# Patient Record
Sex: MALE | Race: OTHER | HISPANIC OR LATINO
[De-identification: names, ages, dates, MRNs, and addresses within clinical notes are randomized per-mention and may not be internally consistent; named-entity substitution may affect disease eponyms.]

---

## 2018-08-10 PROBLEM — Z00.00 ENCOUNTER FOR PREVENTIVE HEALTH EXAMINATION: Status: ACTIVE | Noted: 2018-08-10

## 2018-08-13 ENCOUNTER — APPOINTMENT (OUTPATIENT)
Dept: SURGERY | Facility: CLINIC | Age: 58
End: 2018-08-13
Payer: COMMERCIAL

## 2018-08-13 VITALS
SYSTOLIC BLOOD PRESSURE: 117 MMHG | HEART RATE: 56 BPM | BODY MASS INDEX: 30.12 KG/M2 | DIASTOLIC BLOOD PRESSURE: 67 MMHG | WEIGHT: 170 LBS | TEMPERATURE: 97.7 F | HEIGHT: 63 IN

## 2018-08-13 PROCEDURE — 99204 OFFICE O/P NEW MOD 45 MIN: CPT

## 2018-09-11 ENCOUNTER — TRANSCRIPTION ENCOUNTER (OUTPATIENT)
Age: 58
End: 2018-09-11

## 2018-09-11 ENCOUNTER — OUTPATIENT (OUTPATIENT)
Dept: OUTPATIENT SERVICES | Facility: HOSPITAL | Age: 58
LOS: 1 days | End: 2018-09-11
Payer: COMMERCIAL

## 2018-09-11 VITALS
HEIGHT: 62 IN | OXYGEN SATURATION: 98 % | HEART RATE: 60 BPM | TEMPERATURE: 98 F | WEIGHT: 167.99 LBS | RESPIRATION RATE: 16 BRPM | DIASTOLIC BLOOD PRESSURE: 78 MMHG | SYSTOLIC BLOOD PRESSURE: 118 MMHG

## 2018-09-11 DIAGNOSIS — D36.7 BENIGN NEOPLASM OF OTHER SPECIFIED SITES: ICD-10-CM

## 2018-09-11 DIAGNOSIS — Z86.018 PERSONAL HISTORY OF OTHER BENIGN NEOPLASM: Chronic | ICD-10-CM

## 2018-09-11 DIAGNOSIS — R22.2 LOCALIZED SWELLING, MASS AND LUMP, TRUNK: ICD-10-CM

## 2018-09-11 DIAGNOSIS — Z01.818 ENCOUNTER FOR OTHER PREPROCEDURAL EXAMINATION: ICD-10-CM

## 2018-09-11 DIAGNOSIS — Z90.49 ACQUIRED ABSENCE OF OTHER SPECIFIED PARTS OF DIGESTIVE TRACT: Chronic | ICD-10-CM

## 2018-09-11 DIAGNOSIS — R22.31 LOCALIZED SWELLING, MASS AND LUMP, RIGHT UPPER LIMB: ICD-10-CM

## 2018-09-11 PROCEDURE — G0463: CPT

## 2018-09-11 NOTE — H&P PST ADULT - HISTORY OF PRESENT ILLNESS
58 years old male presented for evaluation to PST before planned surgery, pt was diagnosed with mass on back and arm and is scheduled for excision of right forearm and right lower back masses on 9/12/18.

## 2018-09-11 NOTE — H&P PST ADULT - GASTROINTESTINAL DETAILS
soft/no masses palpable/no distention/no bruit/normal/no rebound tenderness/bowel sounds normal/nontender

## 2018-09-11 NOTE — H&P PST ADULT - SKIN COMMENTS
right lower back mass, soft, slight tender to palpation, right forearm mass 1 cm, hard, mobile no pain noted

## 2018-09-11 NOTE — H&P PST ADULT - FAMILY HISTORY
Father  Still living? No  Diabetes mellitus, type 2, Age at diagnosis: Age Unknown     Mother  Still living? No  Diabetes mellitus, type 2, Age at diagnosis: Age Unknown

## 2018-09-11 NOTE — H&P PST ADULT - NEGATIVE CARDIOVASCULAR SYMPTOMS
no peripheral edema/no chest pain/no dyspnea on exertion/no palpitations/no claudication/no paroxysmal nocturnal dyspnea/no orthopnea

## 2018-09-11 NOTE — H&P PST ADULT - RS GEN PE MLT RESP DETAILS PC
no rales/breath sounds equal/no rhonchi/clear to auscultation bilaterally/no wheezes/good air movement/no chest wall tenderness/respirations non-labored/airway patent

## 2018-09-11 NOTE — H&P PST ADULT - BLOOD AVOIDANCE/RESTRICTIONS, PROFILE
Gnosticist beliefs/pt enrolled to Bloodless Program Cat 1, no blood transfusion, documents given to Alisia Hathaway , Bloodless

## 2018-09-11 NOTE — H&P PST ADULT - NEGATIVE OPHTHALMOLOGIC SYMPTOMS
no irritation L/no loss of vision R/no discharge R/no pain R/no irritation R/no loss of vision L/no pain L

## 2018-09-11 NOTE — H&P PST ADULT - NEGATIVE GASTROINTESTINAL SYMPTOMS
no constipation/no flatulence/no abdominal pain/no nausea/no vomiting/no change in bowel habits/no diarrhea

## 2018-09-11 NOTE — H&P PST ADULT - NSANTHOSAYNRD_GEN_A_CORE
No. [place] screening performed.  STOP BANG Legend: 0-2 = LOW Risk; 3-4 = INTERMEDIATE Risk; 5-8 = HIGH Risk

## 2018-09-12 ENCOUNTER — RESULT REVIEW (OUTPATIENT)
Age: 58
End: 2018-09-12

## 2018-09-12 ENCOUNTER — OUTPATIENT (OUTPATIENT)
Dept: OUTPATIENT SERVICES | Facility: HOSPITAL | Age: 58
LOS: 1 days | End: 2018-09-12
Payer: COMMERCIAL

## 2018-09-12 VITALS
DIASTOLIC BLOOD PRESSURE: 56 MMHG | TEMPERATURE: 98 F | RESPIRATION RATE: 16 BRPM | HEART RATE: 56 BPM | SYSTOLIC BLOOD PRESSURE: 111 MMHG | OXYGEN SATURATION: 100 %

## 2018-09-12 VITALS
OXYGEN SATURATION: 100 % | DIASTOLIC BLOOD PRESSURE: 62 MMHG | RESPIRATION RATE: 16 BRPM | TEMPERATURE: 98 F | SYSTOLIC BLOOD PRESSURE: 107 MMHG | HEART RATE: 60 BPM

## 2018-09-12 DIAGNOSIS — R22.2 LOCALIZED SWELLING, MASS AND LUMP, TRUNK: ICD-10-CM

## 2018-09-12 DIAGNOSIS — Z01.818 ENCOUNTER FOR OTHER PREPROCEDURAL EXAMINATION: ICD-10-CM

## 2018-09-12 DIAGNOSIS — D36.7 BENIGN NEOPLASM OF OTHER SPECIFIED SITES: ICD-10-CM

## 2018-09-12 DIAGNOSIS — Z90.49 ACQUIRED ABSENCE OF OTHER SPECIFIED PARTS OF DIGESTIVE TRACT: Chronic | ICD-10-CM

## 2018-09-12 DIAGNOSIS — Z86.018 PERSONAL HISTORY OF OTHER BENIGN NEOPLASM: Chronic | ICD-10-CM

## 2018-09-12 LAB
GLUCOSE BLDC GLUCOMTR-MCNC: 100 MG/DL — HIGH (ref 70–99)
GLUCOSE BLDC GLUCOMTR-MCNC: 106 MG/DL — HIGH (ref 70–99)

## 2018-09-12 PROCEDURE — 25075 EXC FOREARM LES SC < 3 CM: CPT | Mod: RT

## 2018-09-12 PROCEDURE — 88304 TISSUE EXAM BY PATHOLOGIST: CPT | Mod: 26

## 2018-09-12 PROCEDURE — 21933 EXC BACK TUM DEEP 5 CM/>: CPT

## 2018-09-12 PROCEDURE — 21933 EXC BACK TUM DEEP 5 CM/>: CPT | Mod: AS

## 2018-09-12 PROCEDURE — 82962 GLUCOSE BLOOD TEST: CPT

## 2018-09-12 PROCEDURE — 88304 TISSUE EXAM BY PATHOLOGIST: CPT

## 2018-09-12 RX ORDER — ACETAMINOPHEN WITH CODEINE 300MG-30MG
1 TABLET ORAL
Qty: 12 | Refills: 0
Start: 2018-09-12

## 2018-09-12 RX ORDER — HYDROMORPHONE HYDROCHLORIDE 2 MG/ML
0.5 INJECTION INTRAMUSCULAR; INTRAVENOUS; SUBCUTANEOUS
Qty: 0 | Refills: 0 | Status: DISCONTINUED | OUTPATIENT
Start: 2018-09-12 | End: 2018-09-12

## 2018-09-12 NOTE — ASU DISCHARGE PLAN (ADULT/PEDIATRIC). - MEDICATION SUMMARY - MEDICATIONS TO TAKE
I will START or STAY ON the medications listed below when I get home from the hospital:    Tylenol with Codeine #3 oral tablet  -- 1-2  tab(s) by mouth every 4-6 hours, As Needed -for severe pain MDD:6  -- Caution federal law prohibits the transfer of this drug to any person other  than the person for whom it was prescribed.  May cause drowsiness.  Alcohol may intensify this effect.  Use care when operating dangerous machinery.  This product contains acetaminophen.  Do not use  with any other product containing acetaminophen to prevent possible liver damage.  Using more of this medication than prescribed may cause serious breathing problems.    -- Indication: For postop pain    Advil 200 mg oral tablet  -- 1 tab(s) by mouth every 6 hours, As Needed  -- Indication: For pain    metFORMIN 500 mg oral tablet  -- 1 tab(s) by mouth 2 times a day  -- Indication: For diabetes

## 2018-09-12 NOTE — ASU PATIENT PROFILE, ADULT - BLOOD AVOIDANCE/RESTRICTIONS, PROFILE
Episcopal beliefs/pt enrolled to Bloodless Program Cat 1, no blood transfusion, documents given to Alisia Hathaway , Bloodless

## 2018-09-12 NOTE — ASU DISCHARGE PLAN (ADULT/PEDIATRIC). - NOTIFY
Unable to Urinate/Increased Irritability or Sluggishness/Inability to Tolerate Liquids or Foods/Excessive Diarrhea/Fever greater than 101/Swelling that continues/Pain not relieved by Medications/Bleeding that does not stop/Persistent Nausea and Vomiting/Numbness, color, or temperature change to extremity/Numbness, tingling

## 2018-09-12 NOTE — ASU DISCHARGE PLAN (ADULT/PEDIATRIC). - SPECIAL INSTRUCTIONS
Drink plenty of fluids. Rest as needed. Do not lift anything heavier than 10 pounds. You may take motrin or advil for mild pain as needed, in addition to prescribed narcotic medication. You may remove dressing and shower in 48 hours. Please keep white steri-strips in place and allow them to fall off on there own. Pat dry.

## 2018-09-12 NOTE — BRIEF OPERATIVE NOTE - POST-OP DX
Arm mass, right  09/12/2018    Active  Geovanna Wellington  Mass on back  09/12/2018    Active  Geovanna Wellington

## 2018-09-12 NOTE — BRIEF OPERATIVE NOTE - PROCEDURE
<<-----Click on this checkbox to enter Procedure Excision of mass of back  09/12/2018    Active  KKOTAK  Excision of mass of arm  09/12/2018  right  Active  KKOTAK

## 2018-09-17 LAB — SURGICAL PATHOLOGY STUDY: SIGNIFICANT CHANGE UP

## 2018-09-20 ENCOUNTER — APPOINTMENT (OUTPATIENT)
Dept: SURGERY | Facility: CLINIC | Age: 58
End: 2018-09-20
Payer: COMMERCIAL

## 2018-09-20 DIAGNOSIS — Z78.9 OTHER SPECIFIED HEALTH STATUS: ICD-10-CM

## 2018-09-20 DIAGNOSIS — L91.0 HYPERTROPHIC SCAR: ICD-10-CM

## 2018-09-20 DIAGNOSIS — Z80.9 FAMILY HISTORY OF MALIGNANT NEOPLASM, UNSPECIFIED: ICD-10-CM

## 2018-09-20 DIAGNOSIS — Z86.39 PERSONAL HISTORY OF OTHER ENDOCRINE, NUTRITIONAL AND METABOLIC DISEASE: ICD-10-CM

## 2018-09-20 DIAGNOSIS — R22.2 LOCALIZED SWELLING, MASS AND LUMP, TRUNK: ICD-10-CM

## 2018-09-20 PROCEDURE — 99024 POSTOP FOLLOW-UP VISIT: CPT

## 2018-09-20 RX ORDER — GEL-MATRIX PAD DRESS, SILICONE 2"X5.5"
PAD TOPICAL
Qty: 4 | Refills: 0 | Status: ACTIVE | COMMUNITY
Start: 2018-09-20 | End: 1900-01-01

## 2018-09-21 PROBLEM — R22.31 LOCALIZED SWELLING, MASS AND LUMP, RIGHT UPPER LIMB: Chronic | Status: ACTIVE | Noted: 2018-09-11

## 2018-09-21 PROBLEM — R22.2 LOCALIZED SWELLING, MASS AND LUMP, TRUNK: Chronic | Status: ACTIVE | Noted: 2018-09-11

## 2018-09-21 PROBLEM — E11.9 TYPE 2 DIABETES MELLITUS WITHOUT COMPLICATIONS: Chronic | Status: ACTIVE | Noted: 2018-09-11

## 2018-09-21 PROBLEM — K37 UNSPECIFIED APPENDICITIS: Chronic | Status: ACTIVE | Noted: 2018-09-11

## 2021-03-12 ENCOUNTER — OUTPATIENT (OUTPATIENT)
Dept: OUTPATIENT SERVICES | Facility: HOSPITAL | Age: 61
LOS: 1 days | End: 2021-03-12
Payer: MEDICAID

## 2021-03-12 VITALS
HEART RATE: 60 BPM | DIASTOLIC BLOOD PRESSURE: 76 MMHG | HEIGHT: 62 IN | OXYGEN SATURATION: 100 % | RESPIRATION RATE: 18 BRPM | TEMPERATURE: 98 F | SYSTOLIC BLOOD PRESSURE: 144 MMHG | WEIGHT: 160.06 LBS

## 2021-03-12 DIAGNOSIS — Z98.890 OTHER SPECIFIED POSTPROCEDURAL STATES: Chronic | ICD-10-CM

## 2021-03-12 DIAGNOSIS — Z86.018 PERSONAL HISTORY OF OTHER BENIGN NEOPLASM: Chronic | ICD-10-CM

## 2021-03-12 DIAGNOSIS — Z01.818 ENCOUNTER FOR OTHER PREPROCEDURAL EXAMINATION: ICD-10-CM

## 2021-03-12 DIAGNOSIS — R97.20 ELEVATED PROSTATE SPECIFIC ANTIGEN [PSA]: ICD-10-CM

## 2021-03-12 DIAGNOSIS — Z90.49 ACQUIRED ABSENCE OF OTHER SPECIFIED PARTS OF DIGESTIVE TRACT: Chronic | ICD-10-CM

## 2021-03-12 DIAGNOSIS — R73.03 PREDIABETES: ICD-10-CM

## 2021-03-12 DIAGNOSIS — J30.2 OTHER SEASONAL ALLERGIC RHINITIS: ICD-10-CM

## 2021-03-12 DIAGNOSIS — S83.249A OTHER TEAR OF MEDIAL MENISCUS, CURRENT INJURY, UNSPECIFIED KNEE, INITIAL ENCOUNTER: ICD-10-CM

## 2021-03-12 DIAGNOSIS — M23.331 OTHER MENISCUS DERANGEMENTS, OTHER MEDIAL MENISCUS, RIGHT KNEE: ICD-10-CM

## 2021-03-12 PROCEDURE — G0463: CPT

## 2021-03-12 NOTE — H&P PST ADULT - NSICDXPASTSURGICALHX_GEN_ALL_CORE_FT
PAST SURGICAL HISTORY:  H/O lipoma right lower back, right upper back, left thigh excision - 2003    S/P appendectomy 1998     PAST SURGICAL HISTORY:  H/O excision of mass right forearm and right back masses on 09/12/2018    H/O lipoma right lower back, right upper back, left thigh excision - 2003    S/P appendectomy 1998

## 2021-03-12 NOTE — H&P PST ADULT - NSICDXPROBLEM_GEN_ALL_CORE_FT
The patient has been examined and the H&P has been reviewed:    I concur with the findings and no changes have occurred since H&P was written.    Anesthesia/Surgery risks, benefits and alternative options discussed and understood by patient/family.          There are no hospital problems to display for this patient.     PROBLEM DIAGNOSES  Problem: Tear of medial meniscus of knee  Assessment and Plan: Right knee arthroscopy on 03/23/2021. Preoperative instructions discussed with pt and given to pt. Instructed pt that no one will be allowed to come to hospital with him, to notify security when he arrives in the lobby of the hospital that he is here for surgery, that he will need someone to come to the hospital to pick him up after surgery,not to eat or drink anything after midnight the night before the surgery, to avoid NSAIDs such as Ibuprofen, Motrin, Aleve, Advil, naproxen before surgery, to take Tylenol if needed for pain, to report if he has been exposed to anyone with any contagious diseases including Covid-19 or if he is exhibiting any symptoms of COVID-19,  to keep appointment for COVID-19  test 3 days before surgery. Instructed about use of Chlorhexidine 4% soap before surgery. Verbalized understanding of instructions given.     Problem: Prediabetes  Assessment and Plan: Perioperative glucose monitoring and coverage as needed. Follow-up with PCP for diabetic management.        PROBLEM DIAGNOSES  Problem: Elevated PSA, less than 10 ng/ml  Assessment and Plan: Follow-up with PCP/urology for management.     Problem: Tear of medial meniscus of knee  Assessment and Plan: Right knee arthroscopy on 03/23/2021. Preoperative instructions discussed with pt and given to pt. Instructed pt that no one will be allowed to come to hospital with him, to notify security when he arrives in the lobby of the hospital that he is here for surgery, that he will need someone to come to the hospital to pick him up after surgery,not to eat or drink anything after midnight the night before the surgery, to avoid NSAIDs such as Ibuprofen, Motrin, Aleve, Advil, naproxen before surgery, to take Tylenol if needed for pain, to report if he has been exposed to anyone with any contagious diseases including Covid-19 or if he is exhibiting any symptoms of COVID-19,  to keep appointment for COVID-19  test 3 days before surgery. Instructed about use of Chlorhexidine 4% soap before surgery. Verbalized understanding of instructions given.     Problem: Prediabetes  Assessment and Plan: Perioperative glucose monitoring and coverage as needed. Follow-up with PCP for diabetic management.

## 2021-03-12 NOTE — H&P PST ADULT - NSICDXPASTMEDICALHX_GEN_ALL_CORE_FT
PAST MEDICAL HISTORY:  Appendicitis     Arm mass, right     Asthma last attack in 2001    Diabetes mellitus, type 2     Mass on back     Tear of medial meniscus of right knee      PAST MEDICAL HISTORY:  Appendicitis     Arm mass, right     Asthma last attack in 2001    Diabetes mellitus, type 2     Mass on back     Prediabetes     Seasonal allergies     Tear of medial meniscus of right knee      PAST MEDICAL HISTORY:  Appendicitis     Arm mass, right     Asthma last attack in 2001    Diabetes mellitus, type 2     Elevated PSA, less than 10 ng/ml     Mass on back     Prediabetes     Seasonal allergies     Tear of medial meniscus of right knee

## 2021-03-12 NOTE — H&P PST ADULT - NEGATIVE ALLERGY TYPES
no indoor environmental allergies/no reactions to medicines/no reactions to animals/no reactions to insect bites

## 2021-03-12 NOTE — H&P PST ADULT - HISTORY OF PRESENT ILLNESS
61 yr old male with PMH of presents with c/o right knee pain due to torn meniscus.  Pt reports worsening of pain with prolonged ambulation and stair climbing. Pt is scheduled for right knee arthroscopy on 03/23/2021. 61 yr old male with PMH of prediabetes, seasonal allergies presents with c/o right knee pain due to torn meniscus.  Pt reports worsening of pain with prolonged ambulation and stair climbing. Pt is scheduled for right knee arthroscopy on 03/23/2021. 61 yr old male with PMH of prediabetes, seasonal allergies, elevated PSA presents with c/o right knee pain due to torn meniscus.  Pt reports worsening of pain with prolonged ambulation and stair climbing. Pt is scheduled for right knee arthroscopy on 03/23/2021.

## 2021-03-12 NOTE — H&P PST ADULT - NSICDXFAMILYHX_GEN_ALL_CORE_FT
FAMILY HISTORY:  Father  Still living? No  Diabetes mellitus, type 2, Age at diagnosis: Age Unknown    Mother  Still living? No  Diabetes mellitus, type 2, Age at diagnosis: Age Unknown

## 2021-03-12 NOTE — H&P PST ADULT - ASSESSMENT
61 yr old male with PMH of prediabetes, seasonal allergies presents with right knee tear meniscus. Pt is scheduled for right knee arthroscopy on 03/23/2021.   61 yr old male with PMH of prediabetes, seasonal allergies, elevated PSA presents with right knee tear meniscus. Pt is scheduled for right knee arthroscopy on 03/23/2021.

## 2021-03-22 ENCOUNTER — TRANSCRIPTION ENCOUNTER (OUTPATIENT)
Age: 61
End: 2021-03-22

## 2021-03-23 ENCOUNTER — RESULT REVIEW (OUTPATIENT)
Age: 61
End: 2021-03-23

## 2021-03-23 ENCOUNTER — OUTPATIENT (OUTPATIENT)
Dept: OUTPATIENT SERVICES | Facility: HOSPITAL | Age: 61
LOS: 1 days | End: 2021-03-23
Payer: MEDICAID

## 2021-03-23 VITALS
HEIGHT: 62 IN | TEMPERATURE: 98 F | HEART RATE: 66 BPM | DIASTOLIC BLOOD PRESSURE: 72 MMHG | SYSTOLIC BLOOD PRESSURE: 123 MMHG | WEIGHT: 160.06 LBS | RESPIRATION RATE: 16 BRPM | OXYGEN SATURATION: 98 %

## 2021-03-23 VITALS
RESPIRATION RATE: 17 BRPM | SYSTOLIC BLOOD PRESSURE: 126 MMHG | DIASTOLIC BLOOD PRESSURE: 63 MMHG | HEART RATE: 59 BPM | OXYGEN SATURATION: 100 %

## 2021-03-23 DIAGNOSIS — Z90.49 ACQUIRED ABSENCE OF OTHER SPECIFIED PARTS OF DIGESTIVE TRACT: Chronic | ICD-10-CM

## 2021-03-23 DIAGNOSIS — Z86.018 PERSONAL HISTORY OF OTHER BENIGN NEOPLASM: Chronic | ICD-10-CM

## 2021-03-23 DIAGNOSIS — Z98.890 OTHER SPECIFIED POSTPROCEDURAL STATES: Chronic | ICD-10-CM

## 2021-03-23 DIAGNOSIS — M23.331 OTHER MENISCUS DERANGEMENTS, OTHER MEDIAL MENISCUS, RIGHT KNEE: ICD-10-CM

## 2021-03-23 LAB
GLUCOSE BLDC GLUCOMTR-MCNC: 127 MG/DL — HIGH (ref 70–99)
GLUCOSE BLDC GLUCOMTR-MCNC: 130 MG/DL — HIGH (ref 70–99)

## 2021-03-23 PROCEDURE — 82962 GLUCOSE BLOOD TEST: CPT

## 2021-03-23 PROCEDURE — 88304 TISSUE EXAM BY PATHOLOGIST: CPT | Mod: 26

## 2021-03-23 PROCEDURE — 29881 ARTHRS KNE SRG MNISECTMY M/L: CPT | Mod: RT

## 2021-03-23 PROCEDURE — 88304 TISSUE EXAM BY PATHOLOGIST: CPT

## 2021-03-23 PROCEDURE — 29876 ARTHRS KNEE SURG SYNVCT MAJ: CPT | Mod: RT

## 2021-03-23 RX ORDER — IBUPROFEN 200 MG
1 TABLET ORAL
Qty: 0 | Refills: 0 | DISCHARGE

## 2021-03-23 RX ORDER — PANTOPRAZOLE SODIUM 20 MG/1
1 TABLET, DELAYED RELEASE ORAL
Qty: 14 | Refills: 0
Start: 2021-03-23 | End: 2021-04-05

## 2021-03-23 RX ORDER — ONDANSETRON 8 MG/1
4 TABLET, FILM COATED ORAL EVERY 6 HOURS
Refills: 0 | Status: DISCONTINUED | OUTPATIENT
Start: 2021-03-23 | End: 2021-03-30

## 2021-03-23 RX ORDER — ACETAMINOPHEN 500 MG
2 TABLET ORAL
Qty: 0 | Refills: 0 | DISCHARGE

## 2021-03-23 RX ORDER — HYDROMORPHONE HYDROCHLORIDE 2 MG/ML
0.5 INJECTION INTRAMUSCULAR; INTRAVENOUS; SUBCUTANEOUS
Refills: 0 | Status: DISCONTINUED | OUTPATIENT
Start: 2021-03-23 | End: 2021-03-23

## 2021-03-23 RX ORDER — CELECOXIB 200 MG/1
200 CAPSULE ORAL ONCE
Refills: 0 | Status: COMPLETED | OUTPATIENT
Start: 2021-03-23 | End: 2021-03-23

## 2021-03-23 RX ORDER — SODIUM CHLORIDE 9 MG/ML
3 INJECTION INTRAMUSCULAR; INTRAVENOUS; SUBCUTANEOUS EVERY 8 HOURS
Refills: 0 | Status: DISCONTINUED | OUTPATIENT
Start: 2021-03-23 | End: 2021-03-23

## 2021-03-23 RX ORDER — ACETAMINOPHEN 500 MG
975 TABLET ORAL ONCE
Refills: 0 | Status: COMPLETED | OUTPATIENT
Start: 2021-03-23 | End: 2021-03-23

## 2021-03-23 RX ORDER — FENTANYL CITRATE 50 UG/ML
25 INJECTION INTRAVENOUS
Refills: 0 | Status: DISCONTINUED | OUTPATIENT
Start: 2021-03-23 | End: 2021-03-23

## 2021-03-23 RX ORDER — OXYCODONE AND ACETAMINOPHEN 5; 325 MG/1; MG/1
1 TABLET ORAL EVERY 4 HOURS
Refills: 0 | Status: DISCONTINUED | OUTPATIENT
Start: 2021-03-23 | End: 2021-03-23

## 2021-03-23 RX ORDER — SODIUM CHLORIDE 9 MG/ML
1000 INJECTION INTRAMUSCULAR; INTRAVENOUS; SUBCUTANEOUS
Refills: 0 | Status: DISCONTINUED | OUTPATIENT
Start: 2021-03-23 | End: 2021-03-30

## 2021-03-23 RX ORDER — SENNA PLUS 8.6 MG/1
1 TABLET ORAL
Qty: 15 | Refills: 0
Start: 2021-03-23 | End: 2021-04-06

## 2021-03-23 RX ORDER — OXYCODONE AND ACETAMINOPHEN 5; 325 MG/1; MG/1
2 TABLET ORAL EVERY 6 HOURS
Refills: 0 | Status: DISCONTINUED | OUTPATIENT
Start: 2021-03-23 | End: 2021-03-23

## 2021-03-23 RX ADMIN — Medication 975 MILLIGRAM(S): at 07:01

## 2021-03-23 RX ADMIN — CELECOXIB 200 MILLIGRAM(S): 200 CAPSULE ORAL at 07:01

## 2021-03-23 NOTE — ASU PATIENT PROFILE, ADULT - PSH
H/O excision of mass  right forearm and right back masses on 09/12/2018  H/O lipoma  right lower back, right upper back, left thigh excision - 2003  S/P appendectomy  1998

## 2021-03-23 NOTE — ASU DISCHARGE PLAN (ADULT/PEDIATRIC) - CALL YOUR DOCTOR IF YOU HAVE ANY OF THE FOLLOWING:
Swelling that gets worse/Pain not relieved by Medications/Fever greater than (need to indicate Fahrenheit or Celsius)

## 2021-03-23 NOTE — ASU PATIENT PROFILE, ADULT - HARM RISK FACTORS
MD Lizama notified of Bilirubin results. Infant ok to be discharge. Parents to be educated on importance of supplementing and sunlight.     no

## 2021-03-23 NOTE — BRIEF OPERATIVE NOTE - NSICDXBRIEFPREOP_GEN_ALL_CORE_FT
PRE-OP DIAGNOSIS:  Other meniscus derangements, other medial meniscus, right knee 23-Mar-2021 09:41:44  Be Celis

## 2021-03-23 NOTE — ASU PATIENT PROFILE, ADULT - PMH
Appendicitis    Arm mass, right    Asthma  last attack in 2001  Diabetes mellitus, type 2    Elevated PSA, less than 10 ng/ml    Mass on back    Prediabetes    Seasonal allergies    Tear of medial meniscus of right knee

## 2021-03-23 NOTE — ASU DISCHARGE PLAN (ADULT/PEDIATRIC) - CARE PROVIDER_API CALL
Og Corea)  Orthopaedic Surgery; Sports Medicine  [address] [place] [address], [address] [place] [address]  Phone: [phone]  Fax: [phone]  Follow Up Time: 1 week

## 2021-03-23 NOTE — BRIEF OPERATIVE NOTE - NSICDXBRIEFPOSTOP_GEN_ALL_CORE_FT
POST-OP DIAGNOSIS:  Lateral meniscus tear 23-Mar-2021 09:42:19  Be Celis  Other meniscus derangements, other medial meniscus, right knee 23-Mar-2021 09:42:06  Be Celis

## 2021-03-25 LAB — SURGICAL PATHOLOGY STUDY: SIGNIFICANT CHANGE UP

## 2021-08-03 NOTE — ASU PREOP CHECKLIST - SIDE RAILS UP
[FreeTextEntry1] : Discontinue smoking.  Urged.  Patient with progressive COPD.\par Continue present bronchodilator therapy\par Commend use of spiriva 2 puffs daily gave samples \par Continue prednisone 5 mg/day. Feels much better on. Risks and benefits discussed. \par For PFT and 6 minute walk\par F/U CT \par Follow-up with primary care doctor for routine labs and evaluation.
done

## 2022-01-03 ENCOUNTER — FORM ENCOUNTER (OUTPATIENT)
Age: 62
End: 2022-01-03

## 2022-01-04 ENCOUNTER — TRANSCRIPTION ENCOUNTER (OUTPATIENT)
Age: 62
End: 2022-01-04

## 2022-06-23 PROBLEM — R73.03 PREDIABETES: Chronic | Status: ACTIVE | Noted: 2021-03-12

## 2022-06-23 PROBLEM — J45.909 UNSPECIFIED ASTHMA, UNCOMPLICATED: Chronic | Status: ACTIVE | Noted: 2018-09-11

## 2022-06-23 PROBLEM — R97.20 ELEVATED PROSTATE SPECIFIC ANTIGEN [PSA]: Chronic | Status: ACTIVE | Noted: 2021-03-22

## 2022-06-23 PROBLEM — J30.2 OTHER SEASONAL ALLERGIC RHINITIS: Chronic | Status: ACTIVE | Noted: 2021-03-12

## 2022-06-23 PROBLEM — S83.241A OTHER TEAR OF MEDIAL MENISCUS, CURRENT INJURY, RIGHT KNEE, INITIAL ENCOUNTER: Chronic | Status: ACTIVE | Noted: 2021-03-12

## 2022-06-29 ENCOUNTER — OUTPATIENT (OUTPATIENT)
Dept: OUTPATIENT SERVICES | Facility: HOSPITAL | Age: 62
LOS: 1 days | End: 2022-06-29
Payer: MEDICAID

## 2022-06-29 VITALS
OXYGEN SATURATION: 97 % | HEART RATE: 60 BPM | DIASTOLIC BLOOD PRESSURE: 62 MMHG | RESPIRATION RATE: 16 BRPM | SYSTOLIC BLOOD PRESSURE: 123 MMHG | HEIGHT: 62 IN | TEMPERATURE: 99 F | WEIGHT: 169.98 LBS

## 2022-06-29 DIAGNOSIS — Z98.890 OTHER SPECIFIED POSTPROCEDURAL STATES: Chronic | ICD-10-CM

## 2022-06-29 DIAGNOSIS — M23.332 OTHER MENISCUS DERANGEMENTS, OTHER MEDIAL MENISCUS, LEFT KNEE: ICD-10-CM

## 2022-06-29 DIAGNOSIS — Z86.018 PERSONAL HISTORY OF OTHER BENIGN NEOPLASM: Chronic | ICD-10-CM

## 2022-06-29 DIAGNOSIS — Z29.9 ENCOUNTER FOR PROPHYLACTIC MEASURES, UNSPECIFIED: ICD-10-CM

## 2022-06-29 DIAGNOSIS — Z90.49 ACQUIRED ABSENCE OF OTHER SPECIFIED PARTS OF DIGESTIVE TRACT: Chronic | ICD-10-CM

## 2022-06-29 DIAGNOSIS — R73.03 PREDIABETES: ICD-10-CM

## 2022-06-29 DIAGNOSIS — Z01.818 ENCOUNTER FOR OTHER PREPROCEDURAL EXAMINATION: ICD-10-CM

## 2022-06-29 PROCEDURE — G0463: CPT

## 2022-06-29 RX ORDER — LORATADINE 10 MG/1
1 TABLET ORAL
Qty: 0 | Refills: 0 | DISCHARGE

## 2022-06-29 NOTE — H&P PST ADULT - NSICDXPASTMEDICALHX_GEN_ALL_CORE_FT
PAST MEDICAL HISTORY:  Appendicitis     Arm mass, right     Asthma last attack in 2001    Diabetes mellitus, type 2     Elevated PSA, less than 10 ng/ml     Mass on back     Other derangement of other part of medial meniscus of left knee     Prediabetes     Seasonal allergies     Tear of medial meniscus of right knee

## 2022-06-29 NOTE — H&P PST ADULT - NSICDXPASTSURGICALHX_GEN_ALL_CORE_FT
PAST SURGICAL HISTORY:  H/O excision of mass right forearm and right back masses on 09/12/2018    H/O lipoma right lower back, right upper back, left thigh excision - 2003    S/P appendectomy 1998    S/P right knee arthroscopy

## 2022-06-29 NOTE — H&P PST ADULT - RESPIRATORY AND THORAX COMMENTS
asthma-triggered by cold weather-last attack in 2001-never hospitalized-no Er visit, not using inhalers

## 2022-06-29 NOTE — H&P PST ADULT - PROBLEM SELECTOR PLAN 2
Pre op instructions discussed with patients and written instructions given to patients. 4% CHG solution given and verbal and written instructions given how to use it to prevent post op infections. Pt will get a call from PST on the day before surgery and inform the time of surgery.  Pt need to come to the hospital on time on the day of surgery.  Instructed patient not to eat or drink anything after midnight the night before surgery, to avoid NSAIDS such as Advil, Aleve, Ibuprofen, Motrin, and Naproxen one week before surgery. May take Tylenol if needed for pain. Instructed to report if there is fever cold or flu like symptoms, or symptoms of  covid or if he/she was exposed to anyone withcovid. Instructed pt to notify the security when she arrives in the lobby that she is here for surgery and that he/she need someone to escort home after surgery.

## 2022-06-29 NOTE — H&P PST ADULT - ASSESSMENT
61 yr old male with PMH of prediabetes, seasonal allergies, elevated PSA presents with right knee tear meniscus. Pt is scheduled for right knee arthroscopy on 03/23/2021.   62 yr old male with PMH of prediabetes, seasonal allergies, elevated PSA,  prostate ca, s/p seed implant and pain and weakness on left knee is scheduled for Left knee arthroscopy on 7/5/22.

## 2022-06-29 NOTE — H&P PST ADULT - HISTORY OF PRESENT ILLNESS
62 year old male with h/o prostate ca, and pain and weakness on left knee is scheduled for Left knee arthroscopy on 7/5/22.

## 2022-07-04 ENCOUNTER — TRANSCRIPTION ENCOUNTER (OUTPATIENT)
Age: 62
End: 2022-07-04

## 2022-07-05 ENCOUNTER — OUTPATIENT (OUTPATIENT)
Dept: OUTPATIENT SERVICES | Facility: HOSPITAL | Age: 62
LOS: 1 days | End: 2022-07-05
Payer: MEDICAID

## 2022-07-05 ENCOUNTER — TRANSCRIPTION ENCOUNTER (OUTPATIENT)
Age: 62
End: 2022-07-05

## 2022-07-05 ENCOUNTER — RESULT REVIEW (OUTPATIENT)
Age: 62
End: 2022-07-05

## 2022-07-05 VITALS
SYSTOLIC BLOOD PRESSURE: 124 MMHG | RESPIRATION RATE: 16 BRPM | TEMPERATURE: 98 F | OXYGEN SATURATION: 100 % | HEART RATE: 55 BPM | DIASTOLIC BLOOD PRESSURE: 74 MMHG

## 2022-07-05 VITALS
OXYGEN SATURATION: 99 % | TEMPERATURE: 98 F | HEART RATE: 70 BPM | RESPIRATION RATE: 16 BRPM | DIASTOLIC BLOOD PRESSURE: 78 MMHG | HEIGHT: 62 IN | SYSTOLIC BLOOD PRESSURE: 123 MMHG | WEIGHT: 169.98 LBS

## 2022-07-05 DIAGNOSIS — Z90.49 ACQUIRED ABSENCE OF OTHER SPECIFIED PARTS OF DIGESTIVE TRACT: Chronic | ICD-10-CM

## 2022-07-05 DIAGNOSIS — Z98.890 OTHER SPECIFIED POSTPROCEDURAL STATES: Chronic | ICD-10-CM

## 2022-07-05 DIAGNOSIS — M23.332 OTHER MENISCUS DERANGEMENTS, OTHER MEDIAL MENISCUS, LEFT KNEE: ICD-10-CM

## 2022-07-05 DIAGNOSIS — Z01.818 ENCOUNTER FOR OTHER PREPROCEDURAL EXAMINATION: ICD-10-CM

## 2022-07-05 DIAGNOSIS — Z86.018 PERSONAL HISTORY OF OTHER BENIGN NEOPLASM: Chronic | ICD-10-CM

## 2022-07-05 LAB
GLUCOSE BLDC GLUCOMTR-MCNC: 116 MG/DL — HIGH (ref 70–99)
GLUCOSE BLDC GLUCOMTR-MCNC: 130 MG/DL — HIGH (ref 70–99)

## 2022-07-05 PROCEDURE — 88304 TISSUE EXAM BY PATHOLOGIST: CPT | Mod: 26

## 2022-07-05 PROCEDURE — 82962 GLUCOSE BLOOD TEST: CPT

## 2022-07-05 PROCEDURE — 88304 TISSUE EXAM BY PATHOLOGIST: CPT

## 2022-07-05 PROCEDURE — 29881 ARTHRS KNE SRG MNISECTMY M/L: CPT | Mod: AS,LT

## 2022-07-05 PROCEDURE — 29879 ARTHRS KNE SRG ABRASJ ARTHRP: CPT | Mod: LT

## 2022-07-05 PROCEDURE — 29879 ARTHRS KNE SRG ABRASJ ARTHRP: CPT | Mod: AS,59,LT

## 2022-07-05 PROCEDURE — 29873 ARTHRS KNEE SURG W/LAT RLS: CPT | Mod: LT

## 2022-07-05 PROCEDURE — 29881 ARTHRS KNE SRG MNISECTMY M/L: CPT | Mod: LT

## 2022-07-05 RX ORDER — SENNA PLUS 8.6 MG/1
2 TABLET ORAL
Qty: 14 | Refills: 0
Start: 2022-07-05 | End: 2022-07-11

## 2022-07-05 RX ORDER — FENTANYL CITRATE 50 UG/ML
25 INJECTION INTRAVENOUS
Refills: 0 | Status: DISCONTINUED | OUTPATIENT
Start: 2022-07-05 | End: 2022-07-05

## 2022-07-05 RX ORDER — SODIUM CHLORIDE 9 MG/ML
3 INJECTION INTRAMUSCULAR; INTRAVENOUS; SUBCUTANEOUS EVERY 8 HOURS
Refills: 0 | Status: DISCONTINUED | OUTPATIENT
Start: 2022-07-05 | End: 2022-07-05

## 2022-07-05 RX ORDER — CELECOXIB 200 MG/1
200 CAPSULE ORAL ONCE
Refills: 0 | Status: COMPLETED | OUTPATIENT
Start: 2022-07-05 | End: 2022-07-05

## 2022-07-05 RX ORDER — OXYCODONE AND ACETAMINOPHEN 5; 325 MG/1; MG/1
2 TABLET ORAL EVERY 6 HOURS
Refills: 0 | Status: DISCONTINUED | OUTPATIENT
Start: 2022-07-05 | End: 2022-07-05

## 2022-07-05 RX ORDER — ACETAMINOPHEN 500 MG
975 TABLET ORAL ONCE
Refills: 0 | Status: COMPLETED | OUTPATIENT
Start: 2022-07-05 | End: 2022-07-05

## 2022-07-05 RX ORDER — METFORMIN HYDROCHLORIDE 850 MG/1
1 TABLET ORAL
Qty: 0 | Refills: 0 | DISCHARGE

## 2022-07-05 RX ORDER — ACETAMINOPHEN 500 MG
650 TABLET ORAL EVERY 6 HOURS
Refills: 0 | Status: DISCONTINUED | OUTPATIENT
Start: 2022-07-05 | End: 2022-07-19

## 2022-07-05 RX ORDER — SODIUM CHLORIDE 9 MG/ML
1000 INJECTION INTRAMUSCULAR; INTRAVENOUS; SUBCUTANEOUS
Refills: 0 | Status: DISCONTINUED | OUTPATIENT
Start: 2022-07-05 | End: 2022-07-19

## 2022-07-05 RX ORDER — OXYCODONE AND ACETAMINOPHEN 5; 325 MG/1; MG/1
1 TABLET ORAL EVERY 4 HOURS
Refills: 0 | Status: DISCONTINUED | OUTPATIENT
Start: 2022-07-05 | End: 2022-07-05

## 2022-07-05 RX ADMIN — Medication 975 MILLIGRAM(S): at 08:20

## 2022-07-05 RX ADMIN — SODIUM CHLORIDE 3 MILLILITER(S): 9 INJECTION INTRAMUSCULAR; INTRAVENOUS; SUBCUTANEOUS at 08:23

## 2022-07-05 RX ADMIN — CELECOXIB 200 MILLIGRAM(S): 200 CAPSULE ORAL at 08:21

## 2022-07-05 NOTE — ASU PREOP CHECKLIST - 1.
pt. is jehovian witness--not adressed in PST (as per protocol)--nurse manager Clotilde made aware--awaiting for NP to come to [facility] to sign papers

## 2022-07-05 NOTE — PHYSICAL THERAPY INITIAL EVALUATION ADULT - ACTIVE RANGE OF MOTION EXAMINATION, REHAB EVAL
Left Ankle-WFL/bilateral upper extremity Active ROM was WFL (within functional limits)/Right LE Active ROM was WFL (within functional limits)

## 2022-07-05 NOTE — ASU DISCHARGE PLAN (ADULT/PEDIATRIC) - NS MD DC FALL RISK RISK
For information on Fall & Injury Prevention, visit: [email or web address] OR  [email or web address] OR  https://www.cdc.gov/steadi/patient.html

## 2022-07-05 NOTE — PHYSICAL THERAPY INITIAL EVALUATION ADULT - LEVEL OF INDEPENDENCE: STAIR NEGOTIATION, REHAB EVAL
64 y o  was seen today, on unit  According to the patient and nursing staff, the following is reported: nursing reporting patient is doing fairly well, no behaviors on unit  Patient reporting improving mood and anxiety  Sleep and appetite are fair  Has been complaint with medications and no agitation witnessed  Medications reviewed, denies SI/HI, denies A/V drake         Mental Status Evaluation:  Appearance:  Adequate hygiene and grooming and Good eye contact   Behavior:  calm, cooperative and friendly   Mood:  dysphoric   Affect: constricted   Speech: Normal rate and Normal volume   Thought Process:  Goal directed and coherent   Thought Content:  Does not verbalize delusional material   Perceptual Disturbances: Denies hallucinations and does not appear to be responding to internal stimuli   Risk Potential: No suicidal or homicidal ideation   Orientation:   O x 3       Current Facility-Administered Medications   Medication Dose Route Frequency Provider Last Rate    acetaminophen  975 mg Oral Q8H [address] Laurena Drown, CRNP      aluminum-magnesium hydroxide-simethicone  30 mL Oral Q4H PRN Laurena Drown, CRNP      amLODIPine  5 mg Oral Daily Laurena Drown, CRNP      aspirin  81 mg Oral Daily Laurena Drown, CRNP      atorvastatin  40 mg Oral Daily [employer], CRNP      benztropine  1 mg Intramuscular Q4H PRN Max 6/day Laurena Drown, CRNP      benztropine  0 5 mg Oral Q4H PRN Max 6/day Laurena Drown, CRNP      busPIRone  20 mg Oral TID Laurena Drown, CRNP      Diclofenac Sodium  2 g Topical 4x Daily Laurena Drown, CRNP      enoxaparin  40 mg Subcutaneous Daily Laurena Drown, CRNP      folic acid  1,632 mcg Oral Daily Laurena Drown, CRNP      hydrOXYzine HCL  25 mg Oral Q6H PRN Max 4/day Laurena Drown, CRNP      LORazepam  1 mg Intramuscular Q6H PRN Max 3/day Laurena Drown, CRNP      LORazepam  0 5 mg Oral Q6H PRN Max 4/day Laurena Drown, CRNP      LORazepam  1 mg Oral Q6H PRN Max 3/day Arslan Torres MERCY Higuera      mirtazapine  7 5 mg Oral HS PRN MERCY Mckinley      morphine  15 mg Oral Q12H [address] MERCY Mckinley      nicotine polacrilex  2 mg Oral Q2H PRN MERCY Mckinley      OLANZapine  5 mg Intramuscular Q3H PRN Max 3/day MERCY Mckinley      OLANZapine  5 mg Oral Q3H PRN Max 3/day MERCY Mckinley      ondansetron  4 mg Oral Q6H PRN MERCY Mckinley      pantoprazole  20 mg Oral Early Morning MERCY Mckinley      PARoxetine  20 mg Oral Daily Ragini San MD      potassium chloride  20 mEq Oral Daily Aldair Kimball, MERCY      prazosin  2 mg Oral Daily MERCY Mckinley      pregabalin  100 mg Oral BID MERCY Mckinley      QUEtiapine  100 mg Oral HS MERCY Mckinley      QUEtiapine  25 mg Oral TID Ragini San MD      senna-docusate sodium  1 tablet Oral Daily PRN MERCY Mckinley      Valbenazine Tosylate  80 mg Oral Daily MERCY Mckinley        Patient Active Problem List    Diagnosis Date Noted    Leukopenia 07/18/2021    Mixed hyperlipidemia 07/17/2021    Medical clearance for psychiatric admission 07/16/2021    History of CVA (cerebrovascular accident) 07/13/2021    Encephalopathy 06/27/2021    Nausea 05/07/2021    Multiple closed fractures of metatarsal bone of right foot 05/02/2021    Chronic back pain 05/02/2021    Arthritis of right knee 10/06/2020    Dysphagia 09/05/2020    Ambulatory dysfunction 09/04/2020    Status post insertion of spinal cord stimulator 08/11/2020    Superficial bacterial infection of skin 07/20/2020    Scar of back 07/20/2020    Impaired skin integrity associated with surgical incision 07/20/2020    Rash 06/03/2020    Primary osteoarthritis of both knees 05/01/2020    Patellofemoral disorder of both knees 05/01/2020    Tardive dyskinesia 03/09/2020    MIMI (obstructive sleep apnea)     Complaint related to dreams 02/13/2020    Morbid obesity with BMI of 40 0-44 9, adult ([facility] [address] ) 02/06/2020    Family history of colorectal cancer 12/11/2019    Encounter for long-term use of opiate analgesic 12/03/2019    Post laminectomy syndrome 10/07/2019    Lumbar disc disease with radiculopathy 02/02/2018    Spinal stenosis of lumbar region with neurogenic claudication 02/02/2018    Lumbar radiculopathy 12/08/2017    Bilateral hip pain 06/19/2017    Primary localized osteoarthritis of both knees 06/16/2017    Chronic bilateral low back pain with bilateral sciatica 03/22/2017    Chronic pain syndrome 02/28/2017    Opioid dependence ([facility] [address] ) 02/28/2017    Sacroiliitis ([facility] [address] ) 02/28/2017    Lumbar spondylosis 10/31/2016    Osteoarthritis of both hips 10/31/2016    Generalized anxiety disorder 10/26/2016    Anxiety 09/08/2016    Major depressive disorder, recurrent episode, moderate degree ([facility] [address] ) 09/08/2016    Right knee pain 06/06/2016    Recent unexplained weight loss 06/06/2016    Fatigue 10/26/2015    Bipolar II disorder ([facility] [address] ) 06/18/2015    Panic disorder without agoraphobia 06/18/2015    Post traumatic stress disorder 06/18/2015    Left hip pain 07/25/2014    Intractable low back pain 06/16/2014    Tremor 06/12/2014    Migraine 05/27/2014    Esophageal reflux 05/01/2014    Cognitive disorder 04/18/2014    Female pelvic pain 10/30/2013    Pain in joint, shoulder region 10/28/2013    Overactive bladder 09/26/2013    Osteoarthritis of knee 02/20/2013    Insomnia 01/31/2013    History of hypokalemia 01/03/2013    Urinary incontinence 09/24/2012    Vitamin D deficiency 09/18/2012    Fibromyalgia 09/14/2012    Essential hypertension 09/14/2012        Plan: Medications reviewed, benefits/risks discussed with patient, condition reviewed with treatment team  Routine monitoring will occur on unit, evaluation of effectiveness and side effects of medications  Will continue w current meds  Verbally instructed. Patient does not have to negotiate steps to get to home.

## 2022-07-05 NOTE — ASU DISCHARGE PLAN (ADULT/PEDIATRIC) - ASU DC SPECIAL INSTRUCTIONSFT
-Keep dressing clean and dry  -Do not wet dressing  -Weight bearing as tolerated with assistive device  -Pain management, prescription sent to pharmacy  -Follow up with Dr. Corea within 7-10 days

## 2022-07-05 NOTE — ASU DISCHARGE PLAN (ADULT/PEDIATRIC) - CARE PROVIDER_API CALL
Og Corea)  Orthopaedic Surgery; Sports Medicine  [address] [place] [address], [address] [place] [address]  Phone: [phone]  Fax: [phone]  Follow Up Time:

## 2022-07-13 LAB — SURGICAL PATHOLOGY STUDY: SIGNIFICANT CHANGE UP

## 2023-05-04 NOTE — ASU PATIENT PROFILE, ADULT - NS PRO PASSIVE SMOKE EXP
No Complex Repair And O-T Advancement Flap Text: The defect edges were debeveled with a #15 scalpel blade.  The primary defect was closed partially with a complex linear closure.  Given the location of the remaining defect, shape of the defect and the proximity to free margins an O-T advancement flap was deemed most appropriate for complete closure of the defect.  Using a sterile surgical marker, an appropriate advancement flap was drawn incorporating the defect and placing the expected incisions within the relaxed skin tension lines where possible.    The area thus outlined was incised deep to adipose tissue with a #15 scalpel blade.  The skin margins were undermined to an appropriate distance in all directions utilizing iris scissors.

## 2024-01-15 NOTE — ASU PREOP CHECKLIST - INTERNAL PROSTHESES
[de-identified] : Follow up cervical spine. Patient states he feels severe pain and tightness. Patient admits to taking Gabapentin and Metaxalone for pain with no relief. Patient states the Gabapentin is giving him blurry vision.  Today's Pain Neck 10+/10 
no

## 2024-12-26 NOTE — ASU PATIENT PROFILE, ADULT - PAIN SCALE PREFERRED, PROFILE
c/o headache, neck pain,  right knee pain s/p fall while on bus 12/13/24. Pt was treated and released at [facility] , negative on CT scan of head. C/o headaches that haven' t gone away since then. Pt c/o right frontal headache. Denies N/V. Denies any Hx. numerical 0-10

## (undated) DEVICE — DRSG ADAPTIC 3X8"

## (undated) DEVICE — BLADE INCISOR CRVD 4.5MM

## (undated) DEVICE — GLV 8 PROTEXIS PI MICRO

## (undated) DEVICE — WAND COBLATION WEREWOLF FLOW 90

## (undated) DEVICE — DRAPE LIGHT HANDLE COVER BLUE

## (undated) DEVICE — PREP CHLORAPREP ORANGE 2PCT 26ML

## (undated) DEVICE — SOL BETADINE PRO IOD 4OZ

## (undated) DEVICE — GLV 8.5 PROTEXIS BLUE

## (undated) DEVICE — SPONGE LAP PAD W RING 18X18"

## (undated) DEVICE — SUT MONOSOF 3-0 18" P-12

## (undated) DEVICE — TUBING DEPUY MITEK FMS INFLOW

## (undated) DEVICE — POSITIONER STIRRUP STRAP W SLIP RING 19X3.5"

## (undated) DEVICE — DRAPE U POLY BLUE 60X72"

## (undated) DEVICE — BLANKET WARMER UPPER ADULT

## (undated) DEVICE — DRSG COMBINE 5X9"

## (undated) DEVICE — WRAP COMPRESSION CALF MED

## (undated) DEVICE — SUT POLYSORB 2-0 30" GS-10 UNDYED

## (undated) DEVICE — DRAPE HALF SHEET 40X57"

## (undated) DEVICE — SOL IRR LR 3000ML

## (undated) DEVICE — NDL SPINAL 18G X 3.5"

## (undated) DEVICE — PACK KNEE ARTHROSCOPY

## (undated) DEVICE — TUBING DEPUY MITEK FMS OUTFLOW

## (undated) DEVICE — SHAVER BLADE S&N INCISOR PLUS PLATINUM 4.5MM

## (undated) DEVICE — DRAPE TOWEL BLUE 17" X 24"